# Patient Record
Sex: MALE
[De-identification: names, ages, dates, MRNs, and addresses within clinical notes are randomized per-mention and may not be internally consistent; named-entity substitution may affect disease eponyms.]

---

## 2020-05-31 ENCOUNTER — HOSPITAL ENCOUNTER (OUTPATIENT)
Dept: HOSPITAL 53 - M ED | Age: 20
LOS: 1 days | Discharge: HOME | End: 2020-06-01
Attending: SURGERY
Payer: COMMERCIAL

## 2020-05-31 VITALS — DIASTOLIC BLOOD PRESSURE: 59 MMHG | SYSTOLIC BLOOD PRESSURE: 112 MMHG

## 2020-05-31 VITALS — DIASTOLIC BLOOD PRESSURE: 81 MMHG | SYSTOLIC BLOOD PRESSURE: 138 MMHG

## 2020-05-31 VITALS — DIASTOLIC BLOOD PRESSURE: 67 MMHG | SYSTOLIC BLOOD PRESSURE: 133 MMHG

## 2020-05-31 VITALS — WEIGHT: 190.26 LBS | BODY MASS INDEX: 27.24 KG/M2 | HEIGHT: 70 IN

## 2020-05-31 VITALS — DIASTOLIC BLOOD PRESSURE: 66 MMHG | SYSTOLIC BLOOD PRESSURE: 134 MMHG

## 2020-05-31 DIAGNOSIS — K35.890: Primary | ICD-10-CM

## 2020-05-31 LAB
ALBUMIN SERPL BCG-MCNC: 4.5 GM/DL (ref 3.2–5.2)
ALT SERPL W P-5'-P-CCNC: 21 U/L (ref 12–78)
BASOPHILS # BLD AUTO: 0 10^3/UL (ref 0–0.2)
BASOPHILS NFR BLD AUTO: 0.2 % (ref 0–1)
BILIRUB CONJ SERPL-MCNC: 0.2 MG/DL (ref 0–0.2)
BILIRUB SERPL-MCNC: 0.4 MG/DL (ref 0.2–1)
BUN SERPL-MCNC: 9 MG/DL (ref 7–18)
CALCIUM SERPL-MCNC: 9.2 MG/DL (ref 8.5–10.1)
CHLORIDE SERPL-SCNC: 104 MEQ/L (ref 98–107)
CO2 SERPL-SCNC: 28 MEQ/L (ref 21–32)
CREAT SERPL-MCNC: 0.95 MG/DL (ref 0.7–1.3)
EOSINOPHIL # BLD AUTO: 0 10^3/UL (ref 0–0.5)
EOSINOPHIL NFR BLD AUTO: 0 % (ref 0–3)
GLUCOSE SERPL-MCNC: 100 MG/DL (ref 70–100)
HCT VFR BLD AUTO: 46.8 % (ref 42–52)
HGB BLD-MCNC: 15.1 G/DL (ref 13.5–17.5)
LIPASE SERPL-CCNC: 80 U/L (ref 73–393)
LYMPHOCYTES # BLD AUTO: 0.6 10^3/UL (ref 1.5–5)
LYMPHOCYTES NFR BLD AUTO: 4.4 % (ref 24–44)
MCH RBC QN AUTO: 24.8 PG (ref 27–33)
MCHC RBC AUTO-ENTMCNC: 32.3 G/DL (ref 32–36.5)
MCV RBC AUTO: 76.7 FL (ref 80–96)
MONOCYTES # BLD AUTO: 0.5 10^3/UL (ref 0–0.8)
MONOCYTES NFR BLD AUTO: 3.6 % (ref 0–5)
NEUTROPHILS # BLD AUTO: 12.4 10^3/UL (ref 1.5–8.5)
NEUTROPHILS NFR BLD AUTO: 91.4 % (ref 36–66)
PLATELET # BLD AUTO: 178 10^3/UL (ref 150–450)
POTASSIUM SERPL-SCNC: 3.9 MEQ/L (ref 3.5–5.1)
PROT SERPL-MCNC: 7.8 GM/DL (ref 6.4–8.2)
RBC # BLD AUTO: 6.1 10^6/UL (ref 4.3–6.1)
SODIUM SERPL-SCNC: 137 MEQ/L (ref 136–145)
WBC # BLD AUTO: 13.6 10^3/UL (ref 4–10)

## 2020-05-31 PROCEDURE — 74177 CT ABD & PELVIS W/CONTRAST: CPT

## 2020-05-31 PROCEDURE — 83690 ASSAY OF LIPASE: CPT

## 2020-05-31 PROCEDURE — 85025 COMPLETE CBC W/AUTO DIFF WBC: CPT

## 2020-05-31 PROCEDURE — 96361 HYDRATE IV INFUSION ADD-ON: CPT

## 2020-05-31 PROCEDURE — 96375 TX/PRO/DX INJ NEW DRUG ADDON: CPT

## 2020-05-31 PROCEDURE — 88304 TISSUE EXAM BY PATHOLOGIST: CPT

## 2020-05-31 PROCEDURE — 36415 COLL VENOUS BLD VENIPUNCTURE: CPT

## 2020-05-31 PROCEDURE — 80048 BASIC METABOLIC PNL TOTAL CA: CPT

## 2020-05-31 PROCEDURE — 99284 EMERGENCY DEPT VISIT MOD MDM: CPT

## 2020-05-31 PROCEDURE — 80076 HEPATIC FUNCTION PANEL: CPT

## 2020-05-31 PROCEDURE — 44970 LAPAROSCOPY APPENDECTOMY: CPT

## 2020-05-31 PROCEDURE — 96365 THER/PROPH/DIAG IV INF INIT: CPT

## 2020-05-31 RX ADMIN — SODIUM CHLORIDE SCH MLS/HR: 9 INJECTION, SOLUTION INTRAVENOUS at 20:32

## 2020-05-31 RX ADMIN — DOCUSATE SODIUM,SENNOSIDES SCH TAB: 50; 8.6 TABLET, FILM COATED ORAL at 22:32

## 2020-05-31 NOTE — REPVR
PROCEDURE INFORMATION: 

Exam: CT Abdomen And Pelvis With Contrast 

Exam date and time: 5/31/2020 5:19 PM 

Age: 20 years old 

Clinical indication: Abdominal pain; Additional info: Rlq abd pain, nv 



TECHNIQUE: 

Imaging protocol: Computed tomography of the abdomen and pelvis with 

intravenous contrast. 

Radiation optimization: All CT scans at this facility use at least one of these 

dose optimization techniques: automated exposure control; mA and/or kV 

adjustment per patient size (includes targeted exams where dose is matched to 

clinical indication); or iterative reconstruction. 

Contrast material: ISOVUE 375; Contrast volume: 100 ml; Contrast route: IV;  



COMPARISON: 

No relevant prior studies available. 



FINDINGS: 

Lungs: No suspicious mass or airspace process in the visualized lung bases. 



Liver: Liver appears normal with no focal abnormality. 

Gallbladder and bile ducts: Gallbladder is present and shows no evidence of 

gallstone. 

Pancreas: Pancreas appears normal. No focal mass or peripancreatic 

inflammation. 

Spleen: Spleen appears homogeneous without focal mass. 

Adrenals: Adrenal glands are normal in appearance. 

Kidneys and ureters: Kidneys appear normal, with no stone, solid mass or 

hydronephrosis. 

Stomach and bowel: No evidence of small bowel obstruction. No evidence of acute 

diverticulitis. 

Appendix: Appendix is abnormal. Appendicolith is seen within the appendix 

lumen. Distal to this, the appendix is fluid-filled and dilated , measuring up 

to 12 mm diameter. No fluid collection or evidence of perforation. 



Intraperitoneal space: No pneumoperitoneum. 

Vasculature: No aortic aneurysm. Main portal and splenic veins enhance 

normally. 

Lymph nodes: No enlarged lymph nodes. 

Bladder: Urinary bladder appears normal. 



Bones/joints: Bony structures show no acute fracture or destructive process. 

Soft tissues: No concerning focal abnormality of the extra-abdominal and pelvic 

soft tissues. 



IMPRESSION: 

Acute appendicitis, appendix being dilated to 12 mm diameter with fluid-filled 

lumen distal to an appendicolith. No perforation or abscess 



Electronically signed by: Josiah Rhoades On 05/31/2020  18:01:09 PM

## 2020-06-01 VITALS — DIASTOLIC BLOOD PRESSURE: 53 MMHG | SYSTOLIC BLOOD PRESSURE: 114 MMHG

## 2020-06-01 VITALS — SYSTOLIC BLOOD PRESSURE: 116 MMHG | DIASTOLIC BLOOD PRESSURE: 64 MMHG

## 2020-06-01 VITALS — DIASTOLIC BLOOD PRESSURE: 57 MMHG | SYSTOLIC BLOOD PRESSURE: 110 MMHG

## 2020-06-01 VITALS — SYSTOLIC BLOOD PRESSURE: 134 MMHG | DIASTOLIC BLOOD PRESSURE: 61 MMHG

## 2020-06-01 VITALS — DIASTOLIC BLOOD PRESSURE: 53 MMHG | SYSTOLIC BLOOD PRESSURE: 109 MMHG

## 2020-06-01 LAB
BASOPHILS # BLD AUTO: 0 10^3/UL (ref 0–0.2)
BASOPHILS NFR BLD AUTO: 0.1 % (ref 0–1)
EOSINOPHIL # BLD AUTO: 0 10^3/UL (ref 0–0.5)
EOSINOPHIL NFR BLD AUTO: 0 % (ref 0–3)
HCT VFR BLD AUTO: 41 % (ref 42–52)
HGB BLD-MCNC: 13.4 G/DL (ref 13.5–17.5)
LYMPHOCYTES # BLD AUTO: 0.6 10^3/UL (ref 1.5–5)
LYMPHOCYTES NFR BLD AUTO: 3.7 % (ref 24–44)
MCH RBC QN AUTO: 25 PG (ref 27–33)
MCHC RBC AUTO-ENTMCNC: 32.7 G/DL (ref 32–36.5)
MCV RBC AUTO: 76.5 FL (ref 80–96)
MONOCYTES # BLD AUTO: 1 10^3/UL (ref 0–0.8)
MONOCYTES NFR BLD AUTO: 6.2 % (ref 0–5)
NEUTROPHILS # BLD AUTO: 14 10^3/UL (ref 1.5–8.5)
NEUTROPHILS NFR BLD AUTO: 89.6 % (ref 36–66)
PLATELET # BLD AUTO: 156 10^3/UL (ref 150–450)
RBC # BLD AUTO: 5.36 10^6/UL (ref 4.3–6.1)
WBC # BLD AUTO: 15.6 10^3/UL (ref 4–10)

## 2020-06-01 RX ADMIN — DOCUSATE SODIUM,SENNOSIDES SCH TAB: 50; 8.6 TABLET, FILM COATED ORAL at 08:41

## 2020-06-01 RX ADMIN — SODIUM CHLORIDE SCH MLS/HR: 9 INJECTION, SOLUTION INTRAVENOUS at 05:10

## 2020-06-01 RX ADMIN — DEXTROSE MONOHYDRATE SCH MLS/HR: 5 INJECTION INTRAVENOUS at 00:18

## 2020-06-01 RX ADMIN — DEXTROSE MONOHYDRATE SCH MLS/HR: 5 INJECTION INTRAVENOUS at 05:09

## 2020-06-06 NOTE — RO
DATE OF PROCEDURE:  05/31/2020

 

PREOPERATIVE DIAGNOSIS:  Appendicitis.

 

POSTOPERATIVE DIAGNOSIS:  Appendicitis.

 

PROCEDURE:  Laparoscopic appendectomy.

 

SURGEON:  Dr. Etienne

 

ASSISTANT:  None.

 

ANESTHESIA:  General.

 

ESTIMATED BLOOD LOSS:  5.

 

COMPLICATIONS:  None.

 

INDICATION FOR PROCEDURE:

Patient is a 20-year-old male, presented with abdominal pain that started at 10

a.m. this morning.  He came into emergency room ended being diagnosed with acute

appendicitis.  Recommendation was to proceed with laparoscopic appendectomy.

Risks and benefits of the procedure not limited but including bleeding,

infection, hernia formation, damage to surrounding structures, need for further

surgery was discussed in detail with the patient. Informed consent was obtained

and procedure was planned.

 

DESCRIPTION OF PROCEDURE:

Patient was brought back to operating room #3.  After sufficient sedation, the

abdomen was sterilely prepped and draped.

 

Next, a time-out was done to confirm proper patient and proper procedure.

Following that a 5 mm incision made in left lower quadrant.  Veress needle

inserted and the abdomen was insufflated to 50 mmHg.

 

Next, the Veress needle was remove and a 5 mm Optiview port was used to gain

access to the abdomen.  Once the abdomen was entered, an 8 mm port site

supraumbilically in midline, another 5 mm port suprapubically in the midline.

The appendix was identified in the right lower quadrant.  It was then elevated up

in the air.  Mesoappendix was dissected down to the base of the appendix using

the Enseal.  Once the base was reached, it was ligated with two PDS Endoloops and

then amputated using the Enseal.  The appendix was then brought out through the 8

mm port site.  Once the appendix was out the incision was irrigated with saline

due to a little bit of leakage.  The abdomen was then desufflated.  Skin

incisions were closed with #4-0 Vicryl subcuticular sutures.  The abdomen cleaned

and dried.  Steri-Strips, 4 x 4, and tape were applied thus ending procedure.

 

 

Dictation.

## 2020-06-06 NOTE — DSES
DATE OF ADMISSION:  05/31/2020

DATE OF DISCHARGE:  06/01/2020

 

ADMISSION DIAGNOSIS:  Appendicitis.

 

DISCHARGE DIAGNOSIS:  Appendicitis.

 

HOSPITAL COURSE:  The patient is a 20-year-old male who presented yesterday with

abdominal pain since 10 a.m., found to have acute appendicitis.  He was brought

to the operating room for urgent laparoscopic appendectomy last evening.

Postoperatively he has been doing well, tolerating diet, pain is controlled, he

is ambulating and urinating without any difficulty.  His white count did go up a

little bit overnight from 13.6 to 15.6.  However, his surgery was uncomplicated

and I am comfortable sending him home today.  He can shower starting this

evening.  No baths for 5 days.  No lifting more than 20 pounds for 2 weeks.  I

sent him home with a pain pill as well as Augmentin to take for 7 days.  All of

his questions are answered and he will followup with me in the office in 2

weeks.

## 2021-02-08 ENCOUNTER — HOSPITAL ENCOUNTER (EMERGENCY)
Dept: HOSPITAL 53 - M ED | Age: 21
Discharge: HOME | End: 2021-02-08
Payer: COMMERCIAL

## 2021-02-08 VITALS
SYSTOLIC BLOOD PRESSURE: 133 MMHG | BODY MASS INDEX: 26.51 KG/M2 | HEIGHT: 70 IN | WEIGHT: 185.19 LBS | DIASTOLIC BLOOD PRESSURE: 86 MMHG
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
## 2021-02-08 DIAGNOSIS — S92.321A: Primary | ICD-10-CM
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
## 2021-02-08 NOTE — REPVR
PROCEDURE INFORMATION: 

Exam: XR Right Toe(s) 

Exam date and time: 2/8/2021 8:53 PM 

Age: 20 years old 

Clinical indication: Pain; Toes; Right; Patient HX: Dropped weight on second 

toe; Additional info: Injury 



TECHNIQUE: 

Imaging protocol: XR Right toes. 

Views: Minimum 2 views. 



COMPARISON: 

No relevant prior studies available. 



FINDINGS: 

Bones/joints: There are radiodensities in the soft tissues along the medial 

aspect of the distal phalanx of the 2nd toe which could represent small 

avulsion fractures. On the AP view there is a cortical irregularity 

periarticular at the base of the distal phalanx. 

Soft tissues:  Soft tissue swelling. 



IMPRESSION: 

Possible avulsion fractures medial aspect distal phalanx of the right 2nd toe. 



Electronically signed by: Anita Griggs On 02/08/2021  21:32:13 PM